# Patient Record
Sex: FEMALE | Race: WHITE | NOT HISPANIC OR LATINO | URBAN - METROPOLITAN AREA
[De-identification: names, ages, dates, MRNs, and addresses within clinical notes are randomized per-mention and may not be internally consistent; named-entity substitution may affect disease eponyms.]

---

## 2018-05-14 PROBLEM — K80.20 CALCULUS OF GALLBLADDER WITHOUT CHOLECYSTITIS WITHOUT OBSTRUCTION: Status: ACTIVE | Noted: 2017-02-18

## 2018-05-14 PROBLEM — G40.909 EPILEPTIC SEIZURE (HCC): Status: ACTIVE | Noted: 2017-02-18

## 2018-05-14 PROBLEM — J35.8 TONSIL STONE: Status: ACTIVE | Noted: 2018-05-14

## 2018-05-14 PROBLEM — K56.609 INTESTINAL OBSTRUCTION (HCC): Status: ACTIVE | Noted: 2018-05-14

## 2018-05-14 PROBLEM — K56.609 SMALL BOWEL OBSTRUCTION (HCC): Status: ACTIVE | Noted: 2017-02-18

## 2018-05-14 PROCEDURE — 88175 CYTOPATH C/V AUTO FLUID REDO: CPT | Performed by: OBSTETRICS & GYNECOLOGY

## 2018-05-19 PROCEDURE — 80177 DRUG SCRN QUAN LEVETIRACETAM: CPT | Performed by: PEDIATRICS

## 2018-05-19 PROCEDURE — 80175 DRUG SCREEN QUAN LAMOTRIGINE: CPT | Performed by: PEDIATRICS

## 2018-07-09 PROCEDURE — 80177 DRUG SCRN QUAN LEVETIRACETAM: CPT | Performed by: PEDIATRICS

## 2018-07-09 PROCEDURE — 80175 DRUG SCREEN QUAN LAMOTRIGINE: CPT | Performed by: PEDIATRICS

## 2019-05-15 PROCEDURE — 88175 CYTOPATH C/V AUTO FLUID REDO: CPT | Performed by: OBSTETRICS & GYNECOLOGY

## 2020-09-08 ENCOUNTER — TELEPHONE (OUTPATIENT)
Dept: OBGYN | Age: 25
End: 2020-09-08

## 2020-09-09 ENCOUNTER — OFFICE VISIT (OUTPATIENT)
Dept: OBGYN | Age: 25
End: 2020-09-09

## 2020-09-09 VITALS
SYSTOLIC BLOOD PRESSURE: 112 MMHG | RESPIRATION RATE: 16 BRPM | TEMPERATURE: 97.9 F | BODY MASS INDEX: 20.66 KG/M2 | HEIGHT: 64 IN | WEIGHT: 121 LBS | DIASTOLIC BLOOD PRESSURE: 70 MMHG

## 2020-09-09 DIAGNOSIS — R39.9 UTI SYMPTOMS: Primary | ICD-10-CM

## 2020-09-09 LAB
APPEARANCE, POC: CLEAR
BILIRUBIN, POC: NEGATIVE
COLOR, POC: YELLOW
GLUCOSE UR-MCNC: NEGATIVE MG/DL
KETONES, POC: NEGATIVE
NITRITE, POC: NEGATIVE
OCCULT BLOOD, POC: NORMAL
PH UR: 6 [PH] (ref 5–9)
PROT UR-MCNC: NEGATIVE G/DL
SP GR UR: 1 (ref 1–1.03)
UROBILINOGEN UR-MCNC: 0.2 MG/DL (ref 0–1)
WBC (LEUKOCYTE) ESTERASE, POC: NORMAL

## 2020-09-09 PROCEDURE — 81002 URINALYSIS NONAUTO W/O SCOPE: CPT | Performed by: OBSTETRICS & GYNECOLOGY

## 2020-09-09 PROCEDURE — 99203 OFFICE O/P NEW LOW 30 MIN: CPT | Performed by: OBSTETRICS & GYNECOLOGY

## 2020-09-09 PROCEDURE — 87088 URINE BACTERIA CULTURE: CPT | Performed by: PATHOLOGY

## 2020-09-09 PROCEDURE — 87186 SC STD MICRODIL/AGAR DIL: CPT | Performed by: PATHOLOGY

## 2020-09-09 PROCEDURE — 87086 URINE CULTURE/COLONY COUNT: CPT | Performed by: PATHOLOGY

## 2020-09-09 RX ORDER — LEVETIRACETAM 500 MG/1
500 TABLET ORAL
COMMUNITY

## 2020-09-09 RX ORDER — ETONOGESTREL AND ETHINYL ESTRADIOL VAGINAL .015; .12 MG/D; MG/D
RING VAGINAL
COMMUNITY
Start: 2020-08-13

## 2020-09-09 RX ORDER — LAMOTRIGINE 100 MG/1
100 TABLET ORAL
COMMUNITY
Start: 2020-07-15

## 2020-09-09 RX ORDER — PHENAZOPYRIDINE HYDROCHLORIDE 100 MG/1
100 TABLET, FILM COATED ORAL 3 TIMES DAILY PRN
Qty: 9 TABLET | Refills: 0 | Status: SHIPPED | OUTPATIENT
Start: 2020-09-09

## 2020-09-09 RX ORDER — NITROFURANTOIN 25; 75 MG/1; MG/1
100 CAPSULE ORAL 2 TIMES DAILY
Qty: 10 CAPSULE | Refills: 0 | Status: SHIPPED | OUTPATIENT
Start: 2020-09-09 | End: 2020-09-14

## 2020-09-09 RX ORDER — TACROLIMUS 1 MG/G
OINTMENT TOPICAL
COMMUNITY
Start: 2020-07-08

## 2020-09-09 RX ORDER — FERROUS SULFATE 324(65)MG
TABLET, DELAYED RELEASE (ENTERIC COATED) ORAL
COMMUNITY

## 2020-09-11 DIAGNOSIS — N39.0 URINARY TRACT INFECTION WITHOUT HEMATURIA, SITE UNSPECIFIED: Primary | ICD-10-CM

## 2020-09-11 LAB — FINAL REPORT: ABNORMAL

## 2020-11-24 ENCOUNTER — TELEPHONE (OUTPATIENT)
Dept: OBGYN | Age: 25
End: 2020-11-24

## 2021-03-24 ENCOUNTER — TELEPHONE (OUTPATIENT)
Dept: NEUROLOGY | Facility: CLINIC | Age: 26
End: 2021-03-24

## 2021-03-24 ENCOUNTER — LAB ENCOUNTER (OUTPATIENT)
Dept: LAB | Facility: HOSPITAL | Age: 26
End: 2021-03-24
Attending: Other
Payer: COMMERCIAL

## 2021-03-24 ENCOUNTER — OFFICE VISIT (OUTPATIENT)
Dept: NEUROLOGY | Facility: CLINIC | Age: 26
End: 2021-03-24
Payer: COMMERCIAL

## 2021-03-24 VITALS
SYSTOLIC BLOOD PRESSURE: 110 MMHG | DIASTOLIC BLOOD PRESSURE: 68 MMHG | HEIGHT: 64 IN | WEIGHT: 116 LBS | BODY MASS INDEX: 19.81 KG/M2 | HEART RATE: 72 BPM

## 2021-03-24 DIAGNOSIS — R56.9 SEIZURE (HCC): ICD-10-CM

## 2021-03-24 DIAGNOSIS — R56.9 SEIZURE (HCC): Primary | ICD-10-CM

## 2021-03-24 LAB
ALT SERPL-CCNC: 18 U/L
AST SERPL-CCNC: 13 U/L (ref 15–37)
BASOPHILS # BLD AUTO: 0.04 X10(3) UL (ref 0–0.2)
BASOPHILS NFR BLD AUTO: 0.5 %
DEPRECATED RDW RBC AUTO: 40.3 FL (ref 35.1–46.3)
EOSINOPHIL # BLD AUTO: 0.05 X10(3) UL (ref 0–0.7)
EOSINOPHIL NFR BLD AUTO: 0.6 %
ERYTHROCYTE [DISTWIDTH] IN BLOOD BY AUTOMATED COUNT: 11.8 % (ref 11–15)
HCT VFR BLD AUTO: 43.6 %
HGB BLD-MCNC: 14.3 G/DL
IMM GRANULOCYTES # BLD AUTO: 0.02 X10(3) UL (ref 0–1)
IMM GRANULOCYTES NFR BLD: 0.3 %
LYMPHOCYTES # BLD AUTO: 1.88 X10(3) UL (ref 1–4)
LYMPHOCYTES NFR BLD AUTO: 24 %
MCH RBC QN AUTO: 30.8 PG (ref 26–34)
MCHC RBC AUTO-ENTMCNC: 32.8 G/DL (ref 31–37)
MCV RBC AUTO: 93.8 FL
MONOCYTES # BLD AUTO: 0.54 X10(3) UL (ref 0.1–1)
MONOCYTES NFR BLD AUTO: 6.9 %
NEUTROPHILS # BLD AUTO: 5.29 X10 (3) UL (ref 1.5–7.7)
NEUTROPHILS # BLD AUTO: 5.29 X10(3) UL (ref 1.5–7.7)
NEUTROPHILS NFR BLD AUTO: 67.7 %
PLATELET # BLD AUTO: 369 10(3)UL (ref 150–450)
RBC # BLD AUTO: 4.65 X10(6)UL
WBC # BLD AUTO: 7.8 X10(3) UL (ref 4–11)

## 2021-03-24 PROCEDURE — 85025 COMPLETE CBC W/AUTO DIFF WBC: CPT | Performed by: OTHER

## 2021-03-24 PROCEDURE — 3008F BODY MASS INDEX DOCD: CPT | Performed by: OTHER

## 2021-03-24 PROCEDURE — 36415 COLL VENOUS BLD VENIPUNCTURE: CPT

## 2021-03-24 PROCEDURE — 84450 TRANSFERASE (AST) (SGOT): CPT

## 2021-03-24 PROCEDURE — 3078F DIAST BP <80 MM HG: CPT | Performed by: OTHER

## 2021-03-24 PROCEDURE — 3074F SYST BP LT 130 MM HG: CPT | Performed by: OTHER

## 2021-03-24 PROCEDURE — 84460 ALANINE AMINO (ALT) (SGPT): CPT

## 2021-03-24 PROCEDURE — 99204 OFFICE O/P NEW MOD 45 MIN: CPT | Performed by: OTHER

## 2021-03-24 NOTE — PROGRESS NOTES
Ms Estrellita Bailey, relates onset of partial complex seizures at the age of 12. She was having frequent daily episodes where she would space out. She had some morning, undescribable part of the episode. No olfactory, gustatory hallucinations. No déjà vu.   She w Tab Take by mouth daily. • levETIRAcetam 500 MG Oral Tab Take 500 mg by mouth 2 (two) times daily. • lamoTRIgine 150 MG Oral Tab Take 150 mg by mouth 2 (two) times daily.           Past Medical History:   Diagnosis Date   • Seizure [facility])    • Sympto Current or Ex-Partner:       Emotionally Abused:       Physically Abused:       Sexually Abused:       ROS:   GENERAL HEALTH: feels well otherwise  SKIN: denies any unusual skin lesions or rashes  EYES: no visual complaints or deficits  HEENT: denies nasal for 6 months from last seizure. Will obtain CBC liver function test.  Asked her to call if she has another seizure which time we will increase lamotrigine. If she wants to become pregnant, ideally should be on 1 anticonvulsant medicine.   Consider increas

## 2021-07-07 ENCOUNTER — OFFICE VISIT (OUTPATIENT)
Dept: OBGYN CLINIC | Facility: CLINIC | Age: 26
End: 2021-07-07
Payer: COMMERCIAL

## 2021-07-07 VITALS
WEIGHT: 121 LBS | SYSTOLIC BLOOD PRESSURE: 127 MMHG | HEART RATE: 80 BPM | BODY MASS INDEX: 21 KG/M2 | DIASTOLIC BLOOD PRESSURE: 85 MMHG

## 2021-07-07 DIAGNOSIS — Z01.419 WELL WOMAN EXAM WITH ROUTINE GYNECOLOGICAL EXAM: Primary | ICD-10-CM

## 2021-07-07 DIAGNOSIS — Z30.09 ENCOUNTER FOR COUNSELING REGARDING CONTRACEPTION: ICD-10-CM

## 2021-07-07 PROCEDURE — 3074F SYST BP LT 130 MM HG: CPT | Performed by: CLINICAL NURSE SPECIALIST

## 2021-07-07 PROCEDURE — 99385 PREV VISIT NEW AGE 18-39: CPT | Performed by: CLINICAL NURSE SPECIALIST

## 2021-07-07 PROCEDURE — 3079F DIAST BP 80-89 MM HG: CPT | Performed by: CLINICAL NURSE SPECIALIST

## 2021-07-07 RX ORDER — LAMOTRIGINE 100 MG/1
TABLET ORAL
COMMUNITY
Start: 2021-03-25

## 2021-07-09 NOTE — PROGRESS NOTES
Zack Alba is a 22year old female [facility] Patient's last menstrual period was 06/09/2021 (exact date). Patient presents with:  Gyn Exam: Annual exam discuss b/c options   New pt. Last annual exam and pap was 2019.  Pap was normal and denies hx of abnorm Tobacco Use      Smoking status: Never Smoker      Smokeless tobacco: Never Used    Vaping Use      Vaping Use: Never used    Substance and Sexual Activity      Alcohol use: Yes        Comment: [facility]      Drug use: No      Sexual activity: Yes        Partne Grandfather    • Cancer Maternal Grandmother         skin cancer   • Heart Attack Maternal Grandfather        MEDICATIONS:    Current Outpatient Medications:   •  lamoTRIgine 100 MG Oral Tab, TAKE 3 TABLETS BY MOUTH EVERY MORNING AND 2 AND 1/2 TABLETS AT N without palpable masses, tenderness, asymmetry, nipple discharge, nipple retraction or skin changes  Abdomen:  soft, nontender, nondistended, no masses  Skin/Hair: no unusual rashes or bruises  Extremities: no edema, no cyanosis  Psychiatric:  Oriented to

## 2021-09-16 ENCOUNTER — PATIENT MESSAGE (OUTPATIENT)
Dept: OBGYN CLINIC | Facility: CLINIC | Age: 26
End: 2021-09-16

## 2021-09-16 NOTE — TELEPHONE ENCOUNTER
From: Chyna Fernandez  Sent: 9/16/2021 11:11 AM CDT  To: [facility] Ob/Gyne Clinical Staff  Subject: Upcoming Procedure Question    Erinn Kathleen, thanks for your reply. My period will pretty much start on the dot either Tuesday Sept 28th or Wednesday Sept 29th.  Estevez

## 2021-09-28 ENCOUNTER — TELEPHONE (OUTPATIENT)
Dept: OBGYN CLINIC | Facility: CLINIC | Age: 26
End: 2021-09-28

## 2021-09-28 NOTE — TELEPHONE ENCOUNTER
Pt saw MAF on 7/7/21. MAF's notes state the below. Reviewed options for contraception including OCP's, NuvaRing, Patch, IUD, Nexplanon, Depo as well as risks/benefits/side effects for each.  Pt verbalized understanding and has 2 more months of NuvaRing

## 2021-09-29 ENCOUNTER — TELEPHONE (OUTPATIENT)
Dept: OBGYN CLINIC | Facility: CLINIC | Age: 26
End: 2021-09-29

## 2021-09-29 RX ORDER — ETONOGESTREL AND ETHINYL ESTRADIOL 11.7; 2.7 MG/1; MG/1
1 INSERT, EXTENDED RELEASE VAGINAL
Qty: 1 RING | Refills: 11 | Status: SHIPPED | OUTPATIENT
Start: 2021-09-29 | End: 2021-11-04

## 2021-09-29 NOTE — TELEPHONE ENCOUNTER
Pt informed of MAFs recs and verbalized understanding. Pt accepted aptp with MAF for IUD consult on 12/20. Pt aware that nuvaring was sent to pharmacy. Pt placed on wait list for sooner consult appt.

## 2021-09-29 NOTE — TELEPHONE ENCOUNTER
Pt calling again, concerned about having Nuvaring by Sunday. If she cannot get IUD this week. Please advise.

## 2021-09-29 NOTE — TELEPHONE ENCOUNTER
Refills at pharmacy. Patient notified does not need to  full year supply at same time. Patient verbalized understanding.

## 2021-09-29 NOTE — TELEPHONE ENCOUNTER
Yes, my note states that she needs to RTO for IUD consult if she would like to have this done as we did not discuss it in detail. I have sent Rx for NuvaRIng x 1 ye to be safe and have her schedule a consult for IUD.     MAF

## 2021-10-23 ENCOUNTER — LAB ENCOUNTER (OUTPATIENT)
Dept: LAB | Facility: HOSPITAL | Age: 26
End: 2021-10-23
Attending: NURSE PRACTITIONER
Payer: COMMERCIAL

## 2021-10-23 DIAGNOSIS — G40.209 CRYPTOGENIC PARTIAL COMPLEX EPILEPSY (HCC): Primary | ICD-10-CM

## 2021-10-23 PROCEDURE — 80177 DRUG SCRN QUAN LEVETIRACETAM: CPT

## 2021-10-23 PROCEDURE — 36415 COLL VENOUS BLD VENIPUNCTURE: CPT

## 2021-10-23 PROCEDURE — 80175 DRUG SCREEN QUAN LAMOTRIGINE: CPT

## 2021-11-05 RX ORDER — ETONOGESTREL AND ETHINYL ESTRADIOL 11.7; 2.7 MG/1; MG/1
1 INSERT, EXTENDED RELEASE VAGINAL
Qty: 3 RING | Refills: 2 | Status: SHIPPED | OUTPATIENT
Start: 2021-11-05 | End: 2022-01-24 | Stop reason: ALTCHOICE

## 2021-11-05 NOTE — TELEPHONE ENCOUNTER
Requested Prescriptions     Pending Prescriptions Disp Refills   • Etonogestrel-Ethinyl Estradiol (NUVARING) 0.12-0.015 MG/24HR Vaginal Ring 1 ring 11     Sig: Place 1 ring vaginally every 28 days.      Last annual 7/7/21  Last filled 9/29/21 x 12 mo at [facility]

## 2021-12-20 ENCOUNTER — OFFICE VISIT (OUTPATIENT)
Dept: OBGYN CLINIC | Facility: CLINIC | Age: 26
End: 2021-12-20
Payer: COMMERCIAL

## 2021-12-20 VITALS
SYSTOLIC BLOOD PRESSURE: 126 MMHG | DIASTOLIC BLOOD PRESSURE: 84 MMHG | BODY MASS INDEX: 21 KG/M2 | WEIGHT: 119.63 LBS | HEART RATE: 79 BPM

## 2021-12-20 DIAGNOSIS — Z30.09 ENCOUNTER FOR COUNSELING REGARDING CONTRACEPTION: Primary | ICD-10-CM

## 2021-12-20 PROCEDURE — 3074F SYST BP LT 130 MM HG: CPT | Performed by: CLINICAL NURSE SPECIALIST

## 2021-12-20 PROCEDURE — 99243 OFF/OP CNSLTJ NEW/EST LOW 30: CPT | Performed by: CLINICAL NURSE SPECIALIST

## 2021-12-20 PROCEDURE — 3079F DIAST BP 80-89 MM HG: CPT | Performed by: CLINICAL NURSE SPECIALIST

## 2021-12-20 RX ORDER — MISOPROSTOL 200 UG/1
400 TABLET ORAL ONCE
Qty: 2 TABLET | Refills: 0 | Status: SHIPPED | OUTPATIENT
Start: 2021-12-20 | End: 2021-12-20

## 2021-12-20 RX ORDER — DICYCLOMINE HYDROCHLORIDE 10 MG/1
CAPSULE ORAL
COMMUNITY
Start: 2021-10-13

## 2021-12-23 NOTE — PROGRESS NOTES
Merle Georges is a 32year old female [facility] Patient's last menstrual period was 11/24/2021. Patient presents with:  Consult: DISCUSS IUD   Last annual exam was July. Using NuvaRing for contraception but would like to discuss IUD options.      OBSTETRICS H Partner Violence: Not on file  Housing Stability: Not on file    MEDICATIONS:    Current Outpatient Medications:   •  dicyclomine 10 MG Oral Cap, TAKE 1 TO 2 CAPSULES BY MOUTH FOUR TIMES DAILY WITH MEALS AND AT BEDTIME, Disp: , Rfl:   •  Etonogestrel-Ethin Disp Refills   • miSOPROStol (CYTOTEC) 200 MCG Oral Tab 2 tablet 0     Sig: Take 2 tablets (400 mcg total) by mouth one time for 1 dose.        Reviewed options for IUD's including Fercho Needs, Mirena and Paragard as well as risks (perforation, infection

## 2022-01-13 ENCOUNTER — PATIENT MESSAGE (OUTPATIENT)
Dept: OBGYN CLINIC | Facility: CLINIC | Age: 27
End: 2022-01-13

## 2022-01-13 NOTE — TELEPHONE ENCOUNTER
From: Geronimo Triana  To: THEO Viera  Sent: 1/13/2022 7:40 AM CST  Subject: Upcoming IUD Procedure    Hi,    I have an appointment on January 24th for an iud insertion.  I was unsure of which one I wanted at my consultation, so I was told to let you

## 2022-01-13 NOTE — TELEPHONE ENCOUNTER
To Rubia Sorto, referral department. Pt is coming in for IUD insertion on 1/24 with MAF. Does pt need PA?  Thank you

## 2022-01-21 ENCOUNTER — PATIENT MESSAGE (OUTPATIENT)
Dept: OBGYN CLINIC | Facility: CLINIC | Age: 27
End: 2022-01-21

## 2022-01-22 NOTE — TELEPHONE ENCOUNTER
From: Maricruz Cunningham  Sent: 1/21/2022 4:50 PM CST  To: [facility] Ob/Gyne Clinical Staff  Subject: Upcoming IUD Procedure    Thank you! I do have one question. I was given the Misoprostol to take before the procedure to help with discomfort.    I can’t remember

## 2022-01-24 ENCOUNTER — OFFICE VISIT (OUTPATIENT)
Dept: OBGYN CLINIC | Facility: CLINIC | Age: 27
End: 2022-01-24
Payer: COMMERCIAL

## 2022-01-24 VITALS
SYSTOLIC BLOOD PRESSURE: 129 MMHG | WEIGHT: 119 LBS | DIASTOLIC BLOOD PRESSURE: 88 MMHG | BODY MASS INDEX: 20 KG/M2 | HEART RATE: 92 BPM

## 2022-01-24 DIAGNOSIS — Z32.00 PREGNANCY EXAMINATION OR TEST, PREGNANCY UNCONFIRMED: Primary | ICD-10-CM

## 2022-01-24 DIAGNOSIS — Z30.430 ENCOUNTER FOR INSERTION OF INTRAUTERINE CONTRACEPTIVE DEVICE: ICD-10-CM

## 2022-01-24 LAB — CONTROL LINE PRESENT WITH A CLEAR BACKGROUND (YES/NO): YES YES/NO

## 2022-01-24 PROCEDURE — 3074F SYST BP LT 130 MM HG: CPT | Performed by: CLINICAL NURSE SPECIALIST

## 2022-01-24 PROCEDURE — 81025 URINE PREGNANCY TEST: CPT | Performed by: CLINICAL NURSE SPECIALIST

## 2022-01-24 PROCEDURE — 58300 INSERT INTRAUTERINE DEVICE: CPT | Performed by: CLINICAL NURSE SPECIALIST

## 2022-01-24 PROCEDURE — 3079F DIAST BP 80-89 MM HG: CPT | Performed by: CLINICAL NURSE SPECIALIST

## 2022-01-24 RX ORDER — MISOPROSTOL 200 UG/1
400 TABLET ORAL ONCE
COMMUNITY
Start: 2021-12-20

## 2022-01-25 NOTE — PROCEDURES
IUD Insertion     Pregnancy Results: negative from urine test   Birth control method(s) used:  NuvaRing  Consent signed. Procedure discussed with the patient in detail including indication, risks, benefits, alternatives and complications.     Pelvic Exam F

## 2022-02-21 ENCOUNTER — OFFICE VISIT (OUTPATIENT)
Dept: OBGYN CLINIC | Facility: CLINIC | Age: 27
End: 2022-02-21
Payer: COMMERCIAL

## 2022-02-21 VITALS
HEART RATE: 94 BPM | SYSTOLIC BLOOD PRESSURE: 112 MMHG | WEIGHT: 114 LBS | BODY MASS INDEX: 20 KG/M2 | DIASTOLIC BLOOD PRESSURE: 76 MMHG

## 2022-02-21 DIAGNOSIS — Z30.431 IUD CHECK UP: Primary | ICD-10-CM

## 2022-02-21 PROCEDURE — 3074F SYST BP LT 130 MM HG: CPT | Performed by: CLINICAL NURSE SPECIALIST

## 2022-02-21 PROCEDURE — 99213 OFFICE O/P EST LOW 20 MIN: CPT | Performed by: CLINICAL NURSE SPECIALIST

## 2022-02-21 PROCEDURE — 3078F DIAST BP <80 MM HG: CPT | Performed by: CLINICAL NURSE SPECIALIST

## 2023-10-18 ENCOUNTER — OFFICE VISIT (OUTPATIENT)
Dept: OBGYN CLINIC | Facility: CLINIC | Age: 28
End: 2023-10-18
Payer: COMMERCIAL

## 2023-10-18 VITALS
BODY MASS INDEX: 21 KG/M2 | SYSTOLIC BLOOD PRESSURE: 119 MMHG | WEIGHT: 119.81 LBS | HEART RATE: 97 BPM | DIASTOLIC BLOOD PRESSURE: 81 MMHG

## 2023-10-18 DIAGNOSIS — Z01.419 WELL WOMAN EXAM WITH ROUTINE GYNECOLOGICAL EXAM: Primary | ICD-10-CM

## 2023-10-18 PROCEDURE — 3079F DIAST BP 80-89 MM HG: CPT | Performed by: OBSTETRICS & GYNECOLOGY

## 2023-10-18 PROCEDURE — 99395 PREV VISIT EST AGE 18-39: CPT | Performed by: OBSTETRICS & GYNECOLOGY

## 2023-10-18 PROCEDURE — 3074F SYST BP LT 130 MM HG: CPT | Performed by: OBSTETRICS & GYNECOLOGY

## 2023-10-18 NOTE — PROGRESS NOTES
Sanjuanita Ballesteros is a 32year old female [facility] Patient's last menstrual period was 10/15/2023 (approximate). Patient presents with:  Gyn Exam: Annual // Eleanora Fat left breast pain   Presenting for well woman exam. Last pap smear was normal 2019. She has Genesis IUD that was inserted 2022. Monthly menses with light flow. In monogamous relationship, declines STD testing. OBSTETRICS HISTORY:  OB History     T0    L0    SAB0  IAB0  Ectopic0  Multiple0  Live Births0     GYNE HISTORY:  Patient's last menstrual period was 10/15/2023 (approximate). Sexual activity:   Yes      Partners:   Male      Birth control/ protection:   I.U.D. 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 Seizure ([address]     Symptoms, gastrointestinal          SURGICAL HISTORY:  Past Surgical History:   Procedure Laterality Date    ANESTHESIA FOR EAR SURGERY      BREAST SURGERY      cyst from left breast removed in high school    ENTERECTOMY, RESECT SMALL INTESTINE CONGENITAL ATRESIA;      multipl intestinal atresia d/t being born at 33 weeks.  Had 3 surgeries to fix by 3 mo of age    TONSILLECTOMY      WISDOM TEETH REMOVED         SOCIAL HISTORY:  Social History    Socioeconomic History      Marital status:       Spouse name: Not on file      Number of children: Not on file      Years of education: Not on file      Highest education level: Not on file    Occupational History      Not on file    Tobacco Use      Smoking status: Never      Smokeless tobacco: Never    Vaping Use      Vaping Use: Never used    Substance and Sexual Activity      Alcohol use: Yes        Comment: [facility]      Drug use: No      Sexual activity: Yes        Partners: Male        Birth control/protection: I.U.D. Comment: same partner-engaged    Other Topics      Concerns:         Service: Not Asked        Blood Transfusions: Not Asked        Caffeine Concern: Not Asked        Occupational Exposure: Not Asked        Hobby Hazards: Not Asked        Sleep Concern: Not Asked        Stress Concern: Not Asked        Weight Concern: Not Asked        Special Diet: Not Asked        Back Care: Not Asked        Exercise: Yes          cycle weights        Bike Helmet: Not Asked        Seat Belt: Not Asked        Self-Exams: Not Asked    Social History Narrative      Not on file    Social Determinants of Health  Financial Resource Strain: Not on file  Food Insecurity: Not on file  Transportation Needs: Not on file  Physical Activity: Not on file  Stress: Not on file  Social Connections: Not on file  Housing Stability: Not on file      Depression Screening (PHQ-2/PHQ-9): Over the LAST 2 WEEKS   Little interest or pleasure in doing things: Not at all    Feeling down, depressed, or hopeless: Not at all    PHQ-2 SCORE: 0           MEDICATIONS:    Current Outpatient Medications:     lamoTRIgine 100 MG Oral Tab, , Disp: , Rfl:     Ferrous Sulfate 324 (65 Fe) MG Oral Tab EC, Take by mouth., Disp: , Rfl:     folic acid 1 MG Oral Tab, Take by mouth daily. , Disp: , Rfl:     levETIRAcetam 500 MG Oral Tab, Take 1 tablet (500 mg total) by mouth 2 (two) times daily. , Disp: , Rfl:     miSOPROStol 200 MCG Oral Tab, Take 400 mcg by mouth one time. (Patient not taking: Reported on 2/14/2022), Disp: , Rfl:     dicyclomine 10 MG Oral Cap, TAKE 1 TO 2 CAPSULES BY MOUTH FOUR TIMES DAILY WITH MEALS AND AT BEDTIME (Patient not taking: Reported on 2/14/2022), Disp: , Rfl:     lamoTRIgine 150 MG Oral Tab, Take 150 mg by mouth 2 (two) times daily. , Disp: , Rfl:     ALLERGIES:  No Known Allergies      Review of Systems:  Review of Systems   All other systems reviewed and are negative. 10/18/23  1832   BP: 119/81   Pulse: 97       PHYSICAL EXAM:   Physical Exam  Vitals reviewed. Constitutional:       Appearance: Normal appearance. HENT:      Head: Atraumatic. Eyes:      Pupils: Pupils are equal, round, and reactive to light. Pulmonary:      Effort: Pulmonary effort is normal.   Chest:   Breasts:     Right: Normal. No bleeding, inverted nipple, mass, nipple discharge, skin change or tenderness. Left: Normal. No bleeding, inverted nipple, mass, nipple discharge, skin change or tenderness. Abdominal:      General: Abdomen is flat. Palpations: Abdomen is soft. Tenderness: There is no abdominal tenderness. Genitourinary:     General: Normal vulva. Exam position: Lithotomy position. Labia:         Right: No rash, tenderness, lesion or injury. Left: No rash, tenderness, lesion or injury. Vagina: Normal.      Cervix: Normal.      Uterus: Normal. Not tender. Adnexa: Right adnexa normal and left adnexa normal.        Right: No tenderness or fullness. Left: No tenderness or fullness. Lymphadenopathy:      Upper Body:      Right upper body: No supraclavicular, axillary or pectoral adenopathy. Left upper body: No supraclavicular, axillary or pectoral adenopathy. Skin:     General: Skin is warm and dry. Neurological:      General: No focal deficit present. Mental Status: She is alert and oriented to person, place, and time. Psychiatric:         Mood and Affect: Mood normal.         Behavior: Behavior normal.         Thought Content:  Thought content normal.         Judgment: Judgment normal.           Assessment & Plan:  Marcella Jordan was seen today for gyn exam.    Diagnoses and all orders for this visit:    Well woman exam with routine gynecological exam  -     ThinPrep PAP with HPV Reflex Request B; Future  -     ThinPrep PAP with HPV Reflex Request B        Requested Prescriptions      No prescriptions requested or ordered in this encounter     Pap smear collected. Encourage SBE. Recommend exams yearly.

## 2025-01-08 ENCOUNTER — OFFICE VISIT (OUTPATIENT)
Dept: OBGYN CLINIC | Facility: CLINIC | Age: 30
End: 2025-01-08
Payer: COMMERCIAL

## 2025-01-08 VITALS
HEART RATE: 91 BPM | DIASTOLIC BLOOD PRESSURE: 85 MMHG | WEIGHT: 118.38 LBS | SYSTOLIC BLOOD PRESSURE: 124 MMHG | BODY MASS INDEX: 20 KG/M2

## 2025-01-08 DIAGNOSIS — Z30.432 ENCOUNTER FOR IUD REMOVAL: Primary | ICD-10-CM

## 2025-01-08 PROCEDURE — 58301 REMOVE INTRAUTERINE DEVICE: CPT | Performed by: OBSTETRICS & GYNECOLOGY

## 2025-01-08 PROCEDURE — 3079F DIAST BP 80-89 MM HG: CPT | Performed by: OBSTETRICS & GYNECOLOGY

## 2025-01-08 PROCEDURE — 3074F SYST BP LT 130 MM HG: CPT | Performed by: OBSTETRICS & GYNECOLOGY

## (undated) NOTE — LETTER
AUTHORIZATION FOR SURGICAL OPERATION OR OTHER PROCEDURE    1. I hereby authorize Dr. Saeid QUEVEDO , and [facility] staff assigned to my case to perform the following operation and/or procedure at the [facility]:      IUD INSERTION ___________________________________________________             Relationship to Patient:           []  Parent    Responsible person                          []  Spouse  In case of minor or                    [] Other  _____________   Incompetent name:  ___

## (undated) NOTE — LETTER
AUTHORIZATION FOR SURGICAL OPERATION OR OTHER PROCEDURE    1. I hereby authorize Dr. THOMAS, and [facility] staff assigned to my case to perform the following operation and/or procedure at the [facility] site:    _______________________________________________________________________________________________    IUD REMOVAL   _______________________________________________________________________________________________    2.  My physician has explained the nature and purpose of the operation or other procedure, possible alternative methods of treatment, the risks involved, and the possibility of complication to me.  I acknowledge that no guarantee has been made as to the result that may be obtained.  3.  I recognize that, during the course of this operation, or other procedure, unforseen conditions may necessitate additional or different procedure than those listed above.  I, therefore, further authorize and request that the above named physician, his/her physician assistants or designees perform such procedures as are, in his/her professional opinion, necessary and desirable.  4.  Any tissue or organs removed in the operation or other procedure may be disposed of by and at the discretion of the [facility] and [facility].  5.  I understand that in the event of a medical emergency, I will be transported by local paramedics to [facility] or other hospital emergency department.  6.  I certify that I have read and fully understand the above consent to operation and/or other procedure.    7.  I acknowledge that my physician has explained sedation/analgesia administration to me including the risks and benefits.  I consent to the administration of sedation/analgesia as may be necessary or desirable in the judgement of my physician.    Witness signature: ___________________________________________________ Date:  ______/______/_____                    Time:   ________ A.M.  P.M.       Patient Name:  ______________________________________________________  (please print)      Patient signature:  ___________________________________________________             Relationship to Patient:           []  Parent    Responsible person                          []  Spouse  In case of minor or                    [] Other  _____________   Incompetent name:  __________________________________________________                               (please print)      _____________      Responsible person  In case of minor or  Incompetent signature:  _______________________________________________    Statement of Physician  My signature below affirms that prior to the time of the procedure, I have explained to the patient and/or his/her guardian, the risks and benefits involved in the proposed treatment and any reasonable alternative to the proposed treatment.  I have also explained the risks and benefits involved in the refusal of the proposed treatment and have answered the patient's questions.                        Date:  ______/______/_______  Provider                      Signature:  __________________________________________________________       Time:  ___________ A.M    P.M.